# Patient Record
Sex: FEMALE | Race: WHITE | Employment: FULL TIME | URBAN - METROPOLITAN AREA
[De-identification: names, ages, dates, MRNs, and addresses within clinical notes are randomized per-mention and may not be internally consistent; named-entity substitution may affect disease eponyms.]

---

## 2019-01-10 ENCOUNTER — PATIENT OUTREACH (OUTPATIENT)
Dept: FAMILY MEDICINE CLINIC | Facility: CLINIC | Age: 49
End: 2019-01-10

## 2019-02-14 ENCOUNTER — TELEPHONE (OUTPATIENT)
Dept: FAMILY MEDICINE CLINIC | Facility: CLINIC | Age: 49
End: 2019-02-14

## 2019-02-18 ENCOUNTER — PATIENT OUTREACH (OUTPATIENT)
Dept: FAMILY MEDICINE CLINIC | Facility: CLINIC | Age: 49
End: 2019-02-18

## 2019-02-18 NOTE — PROGRESS NOTES
Left a message informing patient that they are due for a physical and confirming that Dr. Luci Corley is their PCP.

## 2019-02-26 ENCOUNTER — PATIENT OUTREACH (OUTPATIENT)
Dept: FAMILY MEDICINE CLINIC | Facility: CLINIC | Age: 49
End: 2019-02-26

## 2020-11-04 ENCOUNTER — OFFICE VISIT (OUTPATIENT)
Dept: FAMILY MEDICINE CLINIC | Facility: CLINIC | Age: 50
End: 2020-11-04
Payer: COMMERCIAL

## 2020-11-04 VITALS
BODY MASS INDEX: 21 KG/M2 | DIASTOLIC BLOOD PRESSURE: 68 MMHG | SYSTOLIC BLOOD PRESSURE: 142 MMHG | RESPIRATION RATE: 18 BRPM | OXYGEN SATURATION: 100 % | HEART RATE: 105 BPM | HEIGHT: 70 IN | TEMPERATURE: 98 F

## 2020-11-04 DIAGNOSIS — L08.9 SKIN INFECTION: Primary | ICD-10-CM

## 2020-11-04 PROCEDURE — 3077F SYST BP >= 140 MM HG: CPT | Performed by: NURSE PRACTITIONER

## 2020-11-04 PROCEDURE — 3078F DIAST BP <80 MM HG: CPT | Performed by: NURSE PRACTITIONER

## 2020-11-04 PROCEDURE — 99072 ADDL SUPL MATRL&STAF TM PHE: CPT | Performed by: NURSE PRACTITIONER

## 2020-11-04 PROCEDURE — 99213 OFFICE O/P EST LOW 20 MIN: CPT | Performed by: NURSE PRACTITIONER

## 2020-11-04 RX ORDER — CEPHALEXIN 500 MG/1
500 CAPSULE ORAL 2 TIMES DAILY
Qty: 14 CAPSULE | Refills: 0 | Status: SHIPPED | OUTPATIENT
Start: 2020-11-04 | End: 2020-11-11

## 2020-11-04 NOTE — PROGRESS NOTES
CHIEF COMPLAINT:   Patient presents with:  Rash     HPI:    Palak Elizabeth is a 52year old female who presents for evaluation of a rash. Per patient rash started in the past 3 months. Rash has been worsening since onset and spreading.  Started on 1 finger on /68   Pulse 105   Temp 97.9 °F (36.6 °C)   Resp 18   Ht 70\"   LMP 10/21/2020   SpO2 100%   BMI 21.09 kg/m²   GENERAL: well developed, well nourished,in no apparent distress  SKIN: Rash/lesion(s): Location: right ring and pinky finger, left ring; ext Cellulitis is an infection of the deep layers of skin. A break in the skin, such as a cut or scratch, can let bacteria under the skin. If the bacteria get to deep layers of the skin, it can be serious.  If not treated, cellulitis can get into the bloodstrea · Fever higher of 100.4º F (38.0º C) or higher after 2 days on antibiotics  Jn last reviewed this educational content on 8/1/2019  © 4581-1996 [employer]. [address] [facility] [place], [address] [place] [address]. All rights reserved.  This information

## 2020-11-04 NOTE — PATIENT INSTRUCTIONS
Cellulitis  Cellulitis is an infection of the deep layers of skin. A break in the skin, such as a cut or scratch, can let bacteria under the skin. If the bacteria get to deep layers of the skin, it can be serious.  If not treated, cellulitis can get into · Fever higher of 100.4º F (38.0º C) or higher after 2 days on antibiotics  Jn last reviewed this educational content on 8/1/2019  © 6014-3303 [employer]. 1407 [facility] [place], [address] [place] [address]. All rights reserved.  This information